# Patient Record
Sex: FEMALE | URBAN - METROPOLITAN AREA
[De-identification: names, ages, dates, MRNs, and addresses within clinical notes are randomized per-mention and may not be internally consistent; named-entity substitution may affect disease eponyms.]

---

## 2018-10-25 ENCOUNTER — APPOINTMENT (RX ONLY)
Dept: URBAN - METROPOLITAN AREA CLINIC 77 | Facility: CLINIC | Age: 63
Setting detail: DERMATOLOGY
End: 2018-10-25

## 2018-10-25 VITALS — WEIGHT: 107 LBS | HEIGHT: 65 IN

## 2018-10-25 DIAGNOSIS — H01.13 ECZEMATOUS DERMATITIS OF EYELID: ICD-10-CM

## 2018-10-25 PROBLEM — M32.10 SYSTEMIC LUPUS ERYTHEMATOSUS, ORGAN OR SYSTEM INVOLVEMENT UNSPECIFIED: Status: ACTIVE | Noted: 2018-10-25

## 2018-10-25 PROBLEM — H01.131 ECZEMATOUS DERMATITIS OF RIGHT UPPER EYELID: Status: ACTIVE | Noted: 2018-10-25

## 2018-10-25 PROBLEM — H01.134 ECZEMATOUS DERMATITIS OF LEFT UPPER EYELID: Status: ACTIVE | Noted: 2018-10-25

## 2018-10-25 PROCEDURE — ? PRESCRIPTION

## 2018-10-25 PROCEDURE — 99202 OFFICE O/P NEW SF 15 MIN: CPT

## 2018-10-25 PROCEDURE — ? COUNSELING

## 2018-10-25 RX ORDER — HYDROCORTISONE 25 MG/G
CREAM TOPICAL BID
Qty: 1 | Refills: 1 | Status: ERX | COMMUNITY
Start: 2018-10-25

## 2018-10-25 RX ORDER — CRISABOROLE 20 MG/G
OINTMENT TOPICAL BID
Qty: 1 | Refills: 3 | Status: ERX | COMMUNITY
Start: 2018-10-25

## 2018-10-25 RX ADMIN — HYDROCORTISONE: 25 CREAM TOPICAL at 00:00

## 2018-10-25 RX ADMIN — CRISABOROLE: 20 OINTMENT TOPICAL at 00:00

## 2018-10-25 ASSESSMENT — LOCATION SIMPLE DESCRIPTION DERM
LOCATION SIMPLE: LEFT SUPERIOR EYELID
LOCATION SIMPLE: RIGHT SUPERIOR EYELID

## 2018-10-25 ASSESSMENT — LOCATION ZONE DERM: LOCATION ZONE: EYELID

## 2018-10-25 ASSESSMENT — LOCATION DETAILED DESCRIPTION DERM
LOCATION DETAILED: LEFT MEDIAL SUPERIOR EYELID
LOCATION DETAILED: RIGHT MEDIAL SUPERIOR EYELID

## 2018-10-25 NOTE — HPI: RASH
What Type Of Note Output Would You Prefer (Optional)?: Standard Output
How Severe Is Your Rash?: mild
Is This A New Presentation, Or A Follow-Up?: Rash
Additional History: With positive improvement

## 2022-12-15 ENCOUNTER — APPOINTMENT (RX ONLY)
Dept: URBAN - METROPOLITAN AREA CLINIC 81 | Facility: CLINIC | Age: 67
Setting detail: DERMATOLOGY
End: 2022-12-15

## 2022-12-15 DIAGNOSIS — L82.0 INFLAMED SEBORRHEIC KERATOSIS: ICD-10-CM

## 2022-12-15 DIAGNOSIS — H01.13 ECZEMATOUS DERMATITIS OF EYELID: ICD-10-CM

## 2022-12-15 DIAGNOSIS — L29.8 OTHER PRURITUS: ICD-10-CM

## 2022-12-15 DIAGNOSIS — L29.89 OTHER PRURITUS: ICD-10-CM

## 2022-12-15 DIAGNOSIS — L82.1 OTHER SEBORRHEIC KERATOSIS: ICD-10-CM

## 2022-12-15 DIAGNOSIS — L72.0 EPIDERMAL CYST: ICD-10-CM

## 2022-12-15 PROBLEM — L30.9 DERMATITIS, UNSPECIFIED: Status: ACTIVE | Noted: 2022-12-15

## 2022-12-15 PROBLEM — D48.5 NEOPLASM OF UNCERTAIN BEHAVIOR OF SKIN: Status: ACTIVE | Noted: 2022-12-15

## 2022-12-15 PROCEDURE — ? PRESCRIPTION

## 2022-12-15 PROCEDURE — 11102 TANGNTL BX SKIN SINGLE LES: CPT

## 2022-12-15 PROCEDURE — ? PRESCRIPTION MEDICATION MANAGEMENT

## 2022-12-15 PROCEDURE — ? COUNSELING

## 2022-12-15 PROCEDURE — ? COUNSELING ALLERGENS

## 2022-12-15 PROCEDURE — ? FULL BODY SKIN EXAM - DECLINED

## 2022-12-15 PROCEDURE — ? COUNSELING: TOPICAL STEROIDS

## 2022-12-15 PROCEDURE — ? BIOPSY BY SHAVE METHOD

## 2022-12-15 PROCEDURE — 99203 OFFICE O/P NEW LOW 30 MIN: CPT | Mod: 25

## 2022-12-15 PROCEDURE — ? ADDITIONAL NOTES

## 2022-12-15 RX ORDER — HYDROCORTISONE 25 MG/G
CREAM TOPICAL
Qty: 30 | Refills: 1 | Status: ERX | COMMUNITY
Start: 2022-12-15

## 2022-12-15 RX ADMIN — HYDROCORTISONE: 25 CREAM TOPICAL at 00:00

## 2022-12-15 ASSESSMENT — LOCATION ZONE DERM
LOCATION ZONE: EYELID
LOCATION ZONE: TRUNK
LOCATION ZONE: NECK

## 2022-12-15 ASSESSMENT — LOCATION SIMPLE DESCRIPTION DERM
LOCATION SIMPLE: RIGHT SUPERIOR EYELID
LOCATION SIMPLE: UPPER BACK
LOCATION SIMPLE: LEFT SUPERIOR EYELID
LOCATION SIMPLE: POSTERIOR NECK
LOCATION SIMPLE: RIGHT ANTERIOR NECK
LOCATION SIMPLE: LEFT LOWER BACK

## 2022-12-15 ASSESSMENT — LOCATION DETAILED DESCRIPTION DERM
LOCATION DETAILED: RIGHT MEDIAL SUPERIOR EYELID
LOCATION DETAILED: LEFT SUPERIOR MEDIAL LOWER BACK
LOCATION DETAILED: INFERIOR THORACIC SPINE
LOCATION DETAILED: RIGHT LATERAL SUPERIOR EYELID
LOCATION DETAILED: RIGHT INFERIOR ANTERIOR NECK
LOCATION DETAILED: LEFT LATERAL SUPERIOR EYELID
LOCATION DETAILED: MID POSTERIOR NECK

## 2022-12-15 NOTE — PROCEDURE: ADDITIONAL NOTES
Render Risk Assessment In Note?: no
Detail Level: Simple
Additional Notes: Recommends consult with [facility] for treatment

## 2022-12-15 NOTE — PROCEDURE: PRESCRIPTION MEDICATION MANAGEMENT
Initiate Treatment: Zyrtec or Allegra QD x 1 month
Plan: Vaseline throughout day for moisture
Detail Level: Zone
Render In Strict Bullet Format?: No

## 2023-01-19 ENCOUNTER — APPOINTMENT (RX ONLY)
Dept: URBAN - METROPOLITAN AREA CLINIC 81 | Facility: CLINIC | Age: 68
Setting detail: DERMATOLOGY
End: 2023-01-19

## 2023-01-19 DIAGNOSIS — H01.13 ECZEMATOUS DERMATITIS OF EYELID: ICD-10-CM

## 2023-01-19 DIAGNOSIS — L82.1 OTHER SEBORRHEIC KERATOSIS: ICD-10-CM

## 2023-01-19 PROBLEM — L30.9 DERMATITIS, UNSPECIFIED: Status: ACTIVE | Noted: 2023-01-19

## 2023-01-19 PROCEDURE — ? BENIGN DESTRUCTION COSMETIC

## 2023-01-19 PROCEDURE — ? COUNSELING: TOPICAL STEROIDS

## 2023-01-19 PROCEDURE — ? FULL BODY SKIN EXAM - DECLINED

## 2023-01-19 PROCEDURE — 99213 OFFICE O/P EST LOW 20 MIN: CPT

## 2023-01-19 PROCEDURE — ? COUNSELING

## 2023-01-19 PROCEDURE — ? COUNSELING ALLERGENS

## 2023-01-19 PROCEDURE — ? PRESCRIPTION MEDICATION MANAGEMENT

## 2023-01-19 PROCEDURE — ? ADDITIONAL NOTES

## 2023-01-19 ASSESSMENT — LOCATION ZONE DERM
LOCATION ZONE: EYELID
LOCATION ZONE: TRUNK

## 2023-01-19 ASSESSMENT — LOCATION DETAILED DESCRIPTION DERM
LOCATION DETAILED: RIGHT MID-UPPER BACK
LOCATION DETAILED: LEFT LATERAL SUPERIOR EYELID
LOCATION DETAILED: RIGHT MEDIAL SUPERIOR EYELID

## 2023-01-19 ASSESSMENT — LOCATION SIMPLE DESCRIPTION DERM
LOCATION SIMPLE: LEFT SUPERIOR EYELID
LOCATION SIMPLE: RIGHT UPPER BACK
LOCATION SIMPLE: RIGHT SUPERIOR EYELID

## 2023-01-19 NOTE — PROCEDURE: ADDITIONAL NOTES
Detail Level: Simple
Additional Notes: Pt has significant improvement but still some mild swelling present. Pt declines oral steroids. Rec restart hydrocortisone x 2 weeks and continue Allegra qd until follow up
Render Risk Assessment In Note?: no

## 2023-01-19 NOTE — PROCEDURE: BENIGN DESTRUCTION COSMETIC
Price (Use Numbers Only, No Special Characters Or $): 0
Consent: The patient's consent was obtained including but not limited to risks of crusting, scabbing, blistering, scarring, darker or lighter pigmentary change, recurrence, incomplete removal and infection.
Detail Level: Simple
Anesthesia Volume In Cc: 0.5
Anesthesia Type: 2% lidocaine with epinephrine
Post-Care Instructions: I reviewed with the patient in detail post-care instructions. Patient is to wear sunprotection, and avoid picking at any of the treated lesions. Pt should apply Vaseline to crusted or scabbing areas.

## 2023-01-19 NOTE — PROCEDURE: PRESCRIPTION MEDICATION MANAGEMENT
Continue Regimen: Zyrtec or Allegra QD\\nRestart hydrocortisone cream BID x 2 weeks
Plan: Vaseline throughout day for moisture
Detail Level: Zone
Render In Strict Bullet Format?: No

## 2023-01-26 ENCOUNTER — APPOINTMENT (RX ONLY)
Dept: URBAN - METROPOLITAN AREA CLINIC 81 | Facility: CLINIC | Age: 68
Setting detail: DERMATOLOGY
End: 2023-01-26

## 2023-01-26 DIAGNOSIS — M32.10 SYSTEMIC LUPUS ERYTHEMATOSUS, ORGAN OR SYSTEM INVOLVEMENT UNSPECIFIED: ICD-10-CM

## 2023-01-26 DIAGNOSIS — H01.13 ECZEMATOUS DERMATITIS OF EYELID: ICD-10-CM

## 2023-01-26 PROBLEM — L30.9 DERMATITIS, UNSPECIFIED: Status: ACTIVE | Noted: 2023-01-26

## 2023-01-26 PROCEDURE — ? PRESCRIPTION

## 2023-01-26 PROCEDURE — 99213 OFFICE O/P EST LOW 20 MIN: CPT

## 2023-01-26 PROCEDURE — ? ADDITIONAL NOTES

## 2023-01-26 PROCEDURE — ? MEDICATION COUNSELING

## 2023-01-26 PROCEDURE — ? COUNSELING: TOPICAL STEROIDS

## 2023-01-26 PROCEDURE — ? COUNSELING

## 2023-01-26 RX ORDER — PREDNISONE 10 MG/1
TABLET ORAL
Qty: 25 | Refills: 0 | Status: ERX | COMMUNITY
Start: 2023-01-26

## 2023-01-26 RX ADMIN — PREDNISONE: 10 TABLET ORAL at 00:00

## 2023-01-26 ASSESSMENT — LOCATION SIMPLE DESCRIPTION DERM
LOCATION SIMPLE: RIGHT SUPERIOR EYELID
LOCATION SIMPLE: LEFT SUPERIOR EYELID

## 2023-01-26 ASSESSMENT — LOCATION DETAILED DESCRIPTION DERM
LOCATION DETAILED: RIGHT LATERAL SUPERIOR EYELID
LOCATION DETAILED: LEFT LATERAL SUPERIOR EYELID

## 2023-01-26 ASSESSMENT — LOCATION ZONE DERM: LOCATION ZONE: EYELID

## 2023-01-26 NOTE — PROCEDURE: MEDICATION COUNSELING
Xelpingz Pregnancy And Lactation Text: This medication is Pregnancy Category D and is not considered safe during pregnancy.  The risk during breast feeding is also uncertain.

## 2023-01-26 NOTE — PROCEDURE: ADDITIONAL NOTES
Render Risk Assessment In Note?: no
Detail Level: Simple
Additional Notes: Discussed pt last work up, pt states she had lab work recently and lupus is in remission
Additional Notes: No imp with topical steroids or antihistamines x 4-6 weeks

## 2023-02-21 ENCOUNTER — APPOINTMENT (RX ONLY)
Dept: URBAN - METROPOLITAN AREA CLINIC 81 | Facility: CLINIC | Age: 68
Setting detail: DERMATOLOGY
End: 2023-02-21

## 2023-02-21 DIAGNOSIS — L29.89 OTHER PRURITUS: ICD-10-CM | Status: INADEQUATELY CONTROLLED

## 2023-02-21 DIAGNOSIS — L29.8 OTHER PRURITUS: ICD-10-CM | Status: INADEQUATELY CONTROLLED

## 2023-02-21 DIAGNOSIS — H01.13 ECZEMATOUS DERMATITIS OF EYELID: ICD-10-CM | Status: WORSENING

## 2023-02-21 PROBLEM — H01.139 ECZEMATOUS DERMATITIS OF UNSPECIFIED EYE, UNSPECIFIED EYELID: Status: ACTIVE | Noted: 2023-02-21

## 2023-02-21 PROCEDURE — ? COUNSELING

## 2023-02-21 PROCEDURE — 96372 THER/PROPH/DIAG INJ SC/IM: CPT

## 2023-02-21 PROCEDURE — ? INTRAMUSCULAR KENALOG

## 2023-02-21 PROCEDURE — 99214 OFFICE O/P EST MOD 30 MIN: CPT | Mod: 25

## 2023-02-21 PROCEDURE — ? PRESCRIPTION MEDICATION MANAGEMENT

## 2023-02-21 ASSESSMENT — LOCATION SIMPLE DESCRIPTION DERM
LOCATION SIMPLE: RIGHT BUTTOCK
LOCATION SIMPLE: LEFT EYEBROW
LOCATION SIMPLE: RIGHT EYEBROW

## 2023-02-21 ASSESSMENT — LOCATION DETAILED DESCRIPTION DERM
LOCATION DETAILED: RIGHT BUTTOCK
LOCATION DETAILED: LEFT LATERAL EYEBROW
LOCATION DETAILED: RIGHT CENTRAL EYEBROW
LOCATION DETAILED: LEFT CENTRAL EYEBROW

## 2023-02-21 ASSESSMENT — LOCATION ZONE DERM
LOCATION ZONE: TRUNK
LOCATION ZONE: FACE

## 2023-02-21 NOTE — PROCEDURE: INTRAMUSCULAR KENALOG
Administered By (Optional): Angel Quintanilla MA
Concentration (Mg/Ml) Of Additional Medication: 2.5
Detail Level: Detailed
Consent: The risks of atrophy were reviewed with the patient.
Add Option For Additional Mediation: No
Kenalog Preparation: kenalog
Total Volume (Ccs): 2
Concentration (Mg/Ml): 40.0

## 2023-06-08 ENCOUNTER — APPOINTMENT (RX ONLY)
Dept: URBAN - METROPOLITAN AREA CLINIC 81 | Facility: CLINIC | Age: 68
Setting detail: DERMATOLOGY
End: 2023-06-08

## 2023-06-08 DIAGNOSIS — H01.13 ECZEMATOUS DERMATITIS OF EYELID: ICD-10-CM

## 2023-06-08 DIAGNOSIS — L29.8 OTHER PRURITUS: ICD-10-CM

## 2023-06-08 DIAGNOSIS — L53.8 OTHER SPECIFIED ERYTHEMATOUS CONDITIONS: ICD-10-CM

## 2023-06-08 DIAGNOSIS — L29.89 OTHER PRURITUS: ICD-10-CM

## 2023-06-08 PROBLEM — H01.139 ECZEMATOUS DERMATITIS OF UNSPECIFIED EYE, UNSPECIFIED EYELID: Status: ACTIVE | Noted: 2023-06-08

## 2023-06-08 PROCEDURE — ? PRESCRIPTION MEDICATION MANAGEMENT

## 2023-06-08 PROCEDURE — 99214 OFFICE O/P EST MOD 30 MIN: CPT

## 2023-06-08 PROCEDURE — ? COUNSELING

## 2023-06-08 ASSESSMENT — LOCATION DETAILED DESCRIPTION DERM
LOCATION DETAILED: LEFT CENTRAL MALAR CHEEK
LOCATION DETAILED: LEFT LATERAL EYEBROW
LOCATION DETAILED: RIGHT CENTRAL MALAR CHEEK
LOCATION DETAILED: RIGHT CENTRAL EYEBROW
LOCATION DETAILED: LEFT CENTRAL EYEBROW

## 2023-06-08 ASSESSMENT — LOCATION SIMPLE DESCRIPTION DERM
LOCATION SIMPLE: LEFT EYEBROW
LOCATION SIMPLE: LEFT CHEEK
LOCATION SIMPLE: RIGHT CHEEK
LOCATION SIMPLE: RIGHT EYEBROW

## 2023-06-08 ASSESSMENT — LOCATION ZONE DERM: LOCATION ZONE: FACE

## 2023-06-08 NOTE — PROCEDURE: PRESCRIPTION MEDICATION MANAGEMENT
Continue Regimen: OPZELURA 1.5% cream (samples)
Detail Level: Zone
Render In Strict Bullet Format?: No

## 2023-10-18 ENCOUNTER — APPOINTMENT (RX ONLY)
Dept: URBAN - METROPOLITAN AREA CLINIC 81 | Facility: CLINIC | Age: 68
Setting detail: DERMATOLOGY
End: 2023-10-18

## 2023-10-18 DIAGNOSIS — L21.8 OTHER SEBORRHEIC DERMATITIS: ICD-10-CM | Status: INADEQUATELY CONTROLLED

## 2023-10-18 DIAGNOSIS — H01.13 ECZEMATOUS DERMATITIS OF EYELID: ICD-10-CM

## 2023-10-18 DIAGNOSIS — L72.0 EPIDERMAL CYST: ICD-10-CM

## 2023-10-18 DIAGNOSIS — L64.8 OTHER ANDROGENIC ALOPECIA: ICD-10-CM

## 2023-10-18 PROBLEM — H01.139 ECZEMATOUS DERMATITIS OF UNSPECIFIED EYE, UNSPECIFIED EYELID: Status: ACTIVE | Noted: 2023-10-18

## 2023-10-18 PROCEDURE — ? COUNSELING

## 2023-10-18 PROCEDURE — ? PRESCRIPTION

## 2023-10-18 PROCEDURE — ? PRESCRIPTION MEDICATION MANAGEMENT

## 2023-10-18 PROCEDURE — ? DEFER

## 2023-10-18 PROCEDURE — ? MEDICATION COUNSELING

## 2023-10-18 PROCEDURE — 99214 OFFICE O/P EST MOD 30 MIN: CPT

## 2023-10-18 RX ORDER — CLOBETASOL PROPIONATE 0.5 MG/ML
SOLUTION TOPICAL BID
Qty: 50 | Refills: 4 | Status: ERX | COMMUNITY
Start: 2023-10-18

## 2023-10-18 RX ORDER — KETOCONAZOLE 20 MG/ML
SHAMPOO, SUSPENSION TOPICAL
Qty: 120 | Refills: 4 | Status: ERX | COMMUNITY
Start: 2023-10-18

## 2023-10-18 RX ORDER — MINOXIDIL 2.5 MG/1
TABLET ORAL
Qty: 45 | Refills: 1 | Status: ERX | COMMUNITY
Start: 2023-10-18

## 2023-10-18 RX ADMIN — MINOXIDIL: 2.5 TABLET ORAL at 00:00

## 2023-10-18 RX ADMIN — CLOBETASOL PROPIONATE: 0.5 SOLUTION TOPICAL at 00:00

## 2023-10-18 RX ADMIN — KETOCONAZOLE: 20 SHAMPOO, SUSPENSION TOPICAL at 00:00

## 2023-10-18 ASSESSMENT — LOCATION SIMPLE DESCRIPTION DERM
LOCATION SIMPLE: LEFT CHEEK
LOCATION SIMPLE: LEFT EYEBROW
LOCATION SIMPLE: LEFT CLAVICULAR SKIN
LOCATION SIMPLE: SCALP
LOCATION SIMPLE: RIGHT CLAVICULAR SKIN
LOCATION SIMPLE: RIGHT CHEEK
LOCATION SIMPLE: POSTERIOR SCALP
LOCATION SIMPLE: RIGHT EYEBROW

## 2023-10-18 ASSESSMENT — LOCATION DETAILED DESCRIPTION DERM
LOCATION DETAILED: RIGHT CLAVICULAR SKIN
LOCATION DETAILED: LEFT CENTRAL EYEBROW
LOCATION DETAILED: LEFT INFERIOR CENTRAL MALAR CHEEK
LOCATION DETAILED: LEFT CLAVICULAR SKIN
LOCATION DETAILED: LEFT SUPERIOR PARIETAL SCALP
LOCATION DETAILED: POSTERIOR MID-PARIETAL SCALP
LOCATION DETAILED: RIGHT CENTRAL EYEBROW
LOCATION DETAILED: RIGHT INFERIOR CENTRAL MALAR CHEEK

## 2023-10-18 ASSESSMENT — LOCATION ZONE DERM
LOCATION ZONE: TRUNK
LOCATION ZONE: FACE
LOCATION ZONE: SCALP

## 2023-10-18 NOTE — PROCEDURE: DEFER
Detail Level: Zone
Size Of Lesion In Cm (Optional): 0
Reason To Defer Override: refer to 
Introduction Text (Please End With A Colon): Look at instructions

## 2023-10-18 NOTE — PROCEDURE: MEDICATION COUNSELING
Soolantra Counseling: I discussed with the patients the risks of topial Soolantra. This is a medicine which decreases the number of mites and inflammation in the skin. You experience burning, stinging, eye irritation or allergic reactions.  Please call our office if you develop any problems from using this medication. 16-Jan-2017

## 2023-10-18 NOTE — PROCEDURE: MEDICATION COUNSELING
Patient needs 2 week hospital discharge follow up with Dr. Lemon to do a wound check.. Please have PSR call patient to schedule appointment.    Tetracycline Counseling: Patient counseled regarding possible photosensitivity and increased risk for sunburn.  Patient instructed to avoid sunlight, if possible.  When exposed to sunlight, patients should wear protective clothing, sunglasses, and sunscreen.  The patient was instructed to call the office immediately if the following severe adverse effects occur:  hearing changes, easy bruising/bleeding, severe headache, or vision changes.  The patient verbalized understanding of the proper use and possible adverse effects of tetracycline.  All of the patient's questions and concerns were addressed. Patient understands to avoid pregnancy while on therapy due to potential birth defects.

## 2023-10-18 NOTE — PROCEDURE: PRESCRIPTION MEDICATION MANAGEMENT
Continue Regimen: OPZELURA 1.5% cream (samples)
Detail Level: Zone
Render In Strict Bullet Format?: No
Initiate Treatment: KETOCONAZOLE 2% shampoo\\nCLOBETASOL 0.05% solution
Initiate Treatment: MINOXIDIL 2.5mg HALF tab op once daily

## 2023-10-24 ENCOUNTER — APPOINTMENT (RX ONLY)
Dept: URBAN - METROPOLITAN AREA CLINIC 86 | Facility: CLINIC | Age: 68
Setting detail: DERMATOLOGY
End: 2023-10-24

## 2023-10-24 DIAGNOSIS — Z41.9 ENCOUNTER FOR PROCEDURE FOR PURPOSES OTHER THAN REMEDYING HEALTH STATE, UNSPECIFIED: ICD-10-CM

## 2023-10-24 PROCEDURE — ? ADDITIONAL NOTES

## 2023-10-24 PROCEDURE — ? COSMETIC QUOTE

## 2023-10-24 NOTE — PROCEDURE: COSMETIC QUOTE
Misc 10 Free Text Discount (In Dollars- Use Only Numbers And Decimals): 0.00
Face Procedure 1 Percentage Discount: 0
Number Of Months: 1
Include Signature: No
Face Procedure 2 Price/Unit (In Dollars- Use Only Numbers And Decimals): 369
Body Procedure 1 Price/Unit (In Dollars- Use Only Numbers And Decimals): 250
Breast Procedure 1: hydrafacial
Body Procedure 1: ISK neck
Include Sales Tax On Surgeon's Fees: Yes
Detail Level: Zone
Face Procedure 1: vi Peel
Breast Procedure 1 Price/Unit (In Dollars- Use Only Numbers And Decimals): 0.0
Face Procedure 1 Price/Unit (In Dollars- Use Only Numbers And Decimals): 389

## 2023-10-24 NOTE — PROCEDURE: ADDITIONAL NOTES
Detail Level: Zone
Additional Notes: Dr. Monterroso came in and quoted patient $250 to do her ISK on neck. Please allow 30 minutes.
Render Risk Assessment In Note?: no

## 2023-11-07 ENCOUNTER — APPOINTMENT (RX ONLY)
Dept: URBAN - METROPOLITAN AREA CLINIC 86 | Facility: CLINIC | Age: 68
Setting detail: DERMATOLOGY
End: 2023-11-07

## 2023-11-07 DIAGNOSIS — Z41.9 ENCOUNTER FOR PROCEDURE FOR PURPOSES OTHER THAN REMEDYING HEALTH STATE, UNSPECIFIED: ICD-10-CM

## 2023-11-07 PROCEDURE — ? ADDITIONAL NOTES

## 2023-11-07 PROCEDURE — ? COSMETIC QUOTE

## 2023-11-07 NOTE — PROCEDURE: ADDITIONAL NOTES
Render Risk Assessment In Note?: no
Detail Level: Zone
Additional Notes: Patient purchased a obagi Elastiderm kit and Tiffany elements. I went over proper use with patient. She is going to wait till the new year for the Vi Peel due to having an event in 3 weeks. I recommended her to have the hydrafacial prior to the event.

## 2023-11-07 NOTE — PROCEDURE: COSMETIC QUOTE
Implant 10 Price/Unit (In Dollars- Use Only Numbers And Decimals): 0.00
Misc Procedure 4 Units: 0
Face Procedure 1 Units: 3
Face Procedure 1 Percentage Discount: 10
Face Procedure 2 Price/Unit (In Dollars- Use Only Numbers And Decimals): 265
Include Sales Tax On Surgeon's Fees: Yes
Use Name For Above Discounts: No
Breast Procedure 1: hydrafacial
Body Procedure 1 Price/Unit (In Dollars- Use Only Numbers And Decimals): 500
Face Procedure 1: vi peel precision plus
Face Procedure 1 Price/Unit (In Dollars- Use Only Numbers And Decimals): 389
Anesthesia Fee Units (Optional): 1
Body Procedure 1: vi Peel to back
Breast Procedure 1 Price/Unit (In Dollars- Use Only Numbers And Decimals): 0.0
Detail Level: Zone

## 2024-04-15 ENCOUNTER — RX ONLY (OUTPATIENT)
Age: 69
Setting detail: RX ONLY
End: 2024-04-15

## 2024-04-15 RX ORDER — MINOXIDIL 2.5 MG/1
TABLET ORAL
Qty: 45 | Refills: 1 | Status: ERX

## 2025-04-29 ENCOUNTER — APPOINTMENT (OUTPATIENT)
Dept: URBAN - METROPOLITAN AREA CLINIC 81 | Facility: CLINIC | Age: 70
Setting detail: DERMATOLOGY
End: 2025-04-29

## 2025-04-29 DIAGNOSIS — L64.8 OTHER ANDROGENIC ALOPECIA: ICD-10-CM

## 2025-04-29 PROBLEM — L65.9 NONSCARRING HAIR LOSS, UNSPECIFIED: Status: ACTIVE | Noted: 2025-04-29

## 2025-04-29 PROCEDURE — 11104 PUNCH BX SKIN SINGLE LESION: CPT

## 2025-04-29 PROCEDURE — 99214 OFFICE O/P EST MOD 30 MIN: CPT | Mod: 25

## 2025-04-29 PROCEDURE — ? PRESCRIPTION MEDICATION MANAGEMENT

## 2025-04-29 PROCEDURE — ? ORDER TESTS

## 2025-04-29 PROCEDURE — ? PRESCRIPTION

## 2025-04-29 PROCEDURE — ? COUNSELING

## 2025-04-29 PROCEDURE — ? BIOPSY BY PUNCH METHOD

## 2025-04-29 RX ORDER — MINOXIDIL 2.5 MG/1
TABLET ORAL
Qty: 45 | Refills: 1 | Status: ERX

## 2025-04-29 ASSESSMENT — LOCATION SIMPLE DESCRIPTION DERM
LOCATION SIMPLE: SCALP
LOCATION SIMPLE: RIGHT SCALP

## 2025-04-29 ASSESSMENT — LOCATION DETAILED DESCRIPTION DERM
LOCATION DETAILED: LEFT SUPERIOR PARIETAL SCALP
LOCATION DETAILED: RIGHT CENTRAL FRONTAL SCALP

## 2025-04-29 ASSESSMENT — LOCATION ZONE DERM: LOCATION ZONE: SCALP

## 2025-04-29 NOTE — PROCEDURE: ORDER TESTS
Expected Date Of Service: 04/29/2025
Billing Type: Third-Party Bill
Bill For Surgical Tray: no
Performing Laboratory: 0

## 2025-04-29 NOTE — PROCEDURE: PRESCRIPTION MEDICATION MANAGEMENT
Continue Regimen: -MINOXIDIL 2.5mg half tablet once daily
Detail Level: Detailed
Render In Strict Bullet Format?: No

## 2025-05-13 ENCOUNTER — APPOINTMENT (OUTPATIENT)
Dept: URBAN - METROPOLITAN AREA CLINIC 81 | Facility: CLINIC | Age: 70
Setting detail: DERMATOLOGY
End: 2025-05-13

## 2025-05-13 DIAGNOSIS — L64.8 OTHER ANDROGENIC ALOPECIA: ICD-10-CM | Status: IMPROVED

## 2025-05-13 PROCEDURE — ? PRESCRIPTION MEDICATION MANAGEMENT

## 2025-05-13 PROCEDURE — ? DIAGNOSIS COMMENT

## 2025-05-13 PROCEDURE — ? COUNSELING

## 2025-05-13 PROCEDURE — 99214 OFFICE O/P EST MOD 30 MIN: CPT

## 2025-05-13 ASSESSMENT — LOCATION ZONE DERM: LOCATION ZONE: SCALP

## 2025-05-13 ASSESSMENT — SEVERITY OF ALOPECIA TOOL: % SCALP HAIR LOST: 22

## 2025-05-13 ASSESSMENT — LOCATION DETAILED DESCRIPTION DERM: LOCATION DETAILED: LEFT SUPERIOR PARIETAL SCALP

## 2025-05-13 ASSESSMENT — LOCATION SIMPLE DESCRIPTION DERM: LOCATION SIMPLE: SCALP

## 2025-05-13 NOTE — PROCEDURE: PRESCRIPTION MEDICATION MANAGEMENT
Sleep Medicine  Dr Manpreet Monge  [id number] [address] [place] [address], [address]  [place] [address] [phone]     Orthopedics  [facility]  [id number] [place] [address], [place] [address]  Telephone: [phone]       Please sign a release of records for your Mammogram - Dr Lindquist  
Continue Regimen: minoxidil 2.5mg tablet
Plan: Discussed increasing MINOXIDL from 2.5 mg once daily to 5 mg once daily. Patient states that she rather stay with the 2.5mg  dosage since it appears to be working. Also discussed topical MINOXIDIL/FINASTERIDE combo but patient [place] Ari deferred for now
Detail Level: Detailed
Render In Strict Bullet Format?: No

## 2025-05-13 NOTE — PROCEDURE: DIAGNOSIS COMMENT
Message left for patient paperwork for dexcom meter is completed, and faxed to insurance for approval. Will discuss at visit 01/05/2023  
Render Risk Assessment In Note?: no
Detail Level: Detailed
Comment: Biopsy proven

## 2025-05-14 ENCOUNTER — APPOINTMENT (OUTPATIENT)
Dept: URBAN - METROPOLITAN AREA CLINIC 81 | Facility: CLINIC | Age: 70
Setting detail: DERMATOLOGY
End: 2025-05-14

## 2025-05-14 DIAGNOSIS — Z48.02 ENCOUNTER FOR REMOVAL OF SUTURES: ICD-10-CM

## 2025-05-14 PROCEDURE — 99024 POSTOP FOLLOW-UP VISIT: CPT

## 2025-05-14 PROCEDURE — ? SUTURE REMOVAL (GLOBAL PERIOD)

## 2025-05-14 ASSESSMENT — LOCATION DETAILED DESCRIPTION DERM: LOCATION DETAILED: LEFT SUPERIOR PARIETAL SCALP

## 2025-05-14 ASSESSMENT — LOCATION ZONE DERM: LOCATION ZONE: SCALP

## 2025-05-14 ASSESSMENT — LOCATION SIMPLE DESCRIPTION DERM: LOCATION SIMPLE: SCALP

## 2025-05-14 NOTE — PROCEDURE: SUTURE REMOVAL (GLOBAL PERIOD)
Detail Level: Detailed
Add 37408 Cpt? (Important Note: In 2017 The Use Of 07658 Is Being Tracked By Cms To Determine Future Global Period Reimbursement For Global Periods): yes